# Patient Record
Sex: FEMALE | Race: WHITE | NOT HISPANIC OR LATINO | Employment: OTHER | URBAN - METROPOLITAN AREA
[De-identification: names, ages, dates, MRNs, and addresses within clinical notes are randomized per-mention and may not be internally consistent; named-entity substitution may affect disease eponyms.]

---

## 2023-09-12 ENCOUNTER — OFFICE VISIT (OUTPATIENT)
Dept: OBGYN CLINIC | Facility: MEDICAL CENTER | Age: 68
End: 2023-09-12
Payer: MEDICARE

## 2023-09-12 VITALS
HEIGHT: 63 IN | WEIGHT: 117 LBS | BODY MASS INDEX: 20.73 KG/M2 | SYSTOLIC BLOOD PRESSURE: 126 MMHG | DIASTOLIC BLOOD PRESSURE: 82 MMHG

## 2023-09-12 DIAGNOSIS — N63.11 BREAST LUMP ON RIGHT SIDE AT 11 O'CLOCK POSITION: Primary | ICD-10-CM

## 2023-09-12 PROCEDURE — 99203 OFFICE O/P NEW LOW 30 MIN: CPT | Performed by: OBSTETRICS & GYNECOLOGY

## 2023-09-12 RX ORDER — CETIRIZINE HYDROCHLORIDE 10 MG/1
CAPSULE, LIQUID FILLED ORAL
COMMUNITY

## 2023-09-12 RX ORDER — METHYLPREDNISOLONE 4 MG/1
TABLET ORAL
COMMUNITY
Start: 2011-02-11

## 2023-09-12 RX ORDER — AZITHROMYCIN 250 MG/1
TABLET, FILM COATED ORAL
COMMUNITY
Start: 2011-05-27

## 2023-09-12 RX ORDER — KRILL/OM-3/DHA/EPA/PHOSPHO/AST 500-110 MG
CAPSULE ORAL
COMMUNITY

## 2023-09-12 RX ORDER — LUTEIN 20 MG
CAPSULE ORAL
COMMUNITY

## 2023-09-12 RX ORDER — LEVOTHYROXINE SODIUM 25 MCG
TABLET ORAL
COMMUNITY
Start: 2023-08-29

## 2023-09-12 RX ORDER — MOMETASONE FUROATE 50 UG/1
SPRAY, METERED NASAL
COMMUNITY
Start: 2011-03-29

## 2023-09-12 RX ORDER — ACETAMINOPHEN 500 MG
TABLET ORAL
COMMUNITY

## 2023-09-12 RX ORDER — ESTRADIOL 0.1 MG/G
CREAM VAGINAL 2 TIMES DAILY
COMMUNITY
Start: 2012-12-27

## 2023-09-12 RX ORDER — GABAPENTIN 100 MG/1
CAPSULE ORAL
COMMUNITY

## 2023-09-12 NOTE — PROGRESS NOTES
[facility] [address] [place] [address]    Assessment/Plan:  No problem-specific Assessment & Plan notes found for this encounter. Diagnoses and all orders for this visit:    Breast lump on right side at 11 o'clock position  -     US breast right limited (diagnostic); Future  -     Mammo diagnostic right w 3d & cad; Future    Other orders  -     mometasone (Nasonex) 50 mcg/act nasal spray; into each nostril  -     methylPREDNISolone 4 MG tablet therapy pack; Take by mouth (Patient not taking: Reported on 9/12/2023)  -     Lutein-Zeaxanthin 20-0.8 MG CAPS;  (Patient not taking: Reported on 9/12/2023)  -     Synthroid 25 MCG tablet; TAKE 1 AND 1/2 TABLETS BY MOUTH EVERY DAY BUT ON SUNDAY AND FRIDAY TAKE 2.  -     Krill Oil (Omega-3) 500 MG CAPS;  (Patient not taking: Reported on 9/12/2023)  -     gabapentin (NEURONTIN) 100 mg capsule; Take by mouth (Patient not taking: Reported on 9/12/2023)  -     fluticasone (VERAMYST) 27.5 MCG/SPRAY nasal spray; 2 sprays into each nostril daily  -     estradiol (ESTRACE VAGINAL) 0.1 mg/g vaginal cream; Insert into the vagina Twice daily  -     esomeprazole (NexIUM) 20 mg capsule; Take 20 mg by mouth (Patient not taking: Reported on 9/12/2023)  -     Cholecalciferol 25 MCG (1000 UT) CHEW; Chew  -     Cetirizine HCl (ZyrTEC Allergy) 10 MG CAPS  -     azithromycin (ZITHROMAX) 250 mg tablet; Take by mouth  -     acetaminophen (TYLENOL) 500 mg tablet;  (Patient not taking: Reported on 9/12/2023)          Subjective:   Dat Antonio is a 70 y.o. No obstetric history on file. female. CC: right breast lump    HPI: HPI  Assessment:   Collin Hill was seen today for breast problem. Diagnoses and all orders for this visit:    Breast lump on right side at 11 o'clock position  -     US breast right limited (diagnostic); Future  -     Mammo diagnostic right w 3d & cad; Future        Plan:    Subjective:    Patient is a 70 y.o. y.o.  Female who presents for a problem visit. Pt is c/o right breast lump. She states she first noticed it about one month ago. Thought she had a bug bite but it did not resolve  With time. There is no problem list on file for this patient. No past medical history on file. No past surgical history on file. No family history on file.     Social History     Socioeconomic History   • Marital status: /Civil Union     Spouse name: Not on file   • Number of children: Not on file   • Years of education: Not on file   • Highest education level: Not on file   Occupational History   • Not on file   Tobacco Use   • Smoking status: Not on file   • Smokeless tobacco: Not on file   Substance and Sexual Activity   • Alcohol use: Not on file   • Drug use: Not on file   • Sexual activity: Not on file   Other Topics Concern   • Not on file   Social History Narrative   • Not on file     Social Determinants of Health     Financial Resource Strain: Not on file   Food Insecurity: Not on file   Transportation Needs: Not on file   Physical Activity: Not on file   Stress: Not on file   Social Connections: Not on file   Intimate Partner Violence: Not on file   Housing Stability: Not on file         Current Outpatient Medications:   •  azithromycin (ZITHROMAX) 250 mg tablet, Take by mouth, Disp: , Rfl:   •  Cetirizine HCl (ZyrTEC Allergy) 10 MG CAPS, , Disp: , Rfl:   •  Cholecalciferol 25 MCG (1000 UT) CHEW, Chew, Disp: , Rfl:   •  estradiol (ESTRACE VAGINAL) 0.1 mg/g vaginal cream, Insert into the vagina Twice daily, Disp: , Rfl:   •  fluticasone (VERAMYST) 27.5 MCG/SPRAY nasal spray, 2 sprays into each nostril daily, Disp: , Rfl:   •  mometasone (Nasonex) 50 mcg/act nasal spray, into each nostril, Disp: , Rfl:   •  Synthroid 25 MCG tablet, TAKE 1 AND 1/2 TABLETS BY MOUTH EVERY DAY BUT ON SUNDAY AND FRIDAY TAKE 2., Disp: , Rfl:   •  acetaminophen (TYLENOL) 500 mg tablet, , Disp: , Rfl:   •  esomeprazole (NexIUM) 20 mg capsule, Take 20 mg by mouth (Patient not taking: Reported on 9/12/2023), Disp: , Rfl:   •  gabapentin (NEURONTIN) 100 mg capsule, Take by mouth (Patient not taking: Reported on 9/12/2023), Disp: , Rfl:   •  Krill Oil (Omega-3) 500 MG CAPS, , Disp: , Rfl:   •  Lutein-Zeaxanthin 20-0.8 MG CAPS, , Disp: , Rfl:   •  methylPREDNISolone 4 MG tablet therapy pack, Take by mouth (Patient not taking: Reported on 9/12/2023), Disp: , Rfl:     Allergies   Allergen Reactions   • Oxycodone-Acetaminophen Other (See Comments)     tylox(rash)       ROS: Review of Systems   Constitutional: Negative. Respiratory: Negative. Cardiovascular: Negative. Gastrointestinal: Negative. Genitourinary: Negative. Musculoskeletal: Negative. All other systems reviewed and are negative. PFSH: The following portions of the patient's history were reviewed and updated as appropriate: allergies, current medications, past family history, past medical history, obstetric history, gynecologic history, past social history, past surgical history and problem list.       Objective:  /82   Ht 5' 3" (1.6 m)   Wt 53.1 kg (117 lb)   BMI 20.73 kg/m²    Physical Exam  Vitals reviewed. Constitutional:       Appearance: Normal appearance. Cardiovascular:      Rate and Rhythm: Normal rate. Pulmonary:      Effort: Pulmonary effort is normal. No respiratory distress. Chest:   Breasts:     Right: Mass (pea sized) and skin change present. No swelling, bleeding, inverted nipple, nipple discharge or tenderness. Left: Normal.   Neurological:      Mental Status: She is alert.    Psychiatric:         Mood and Affect: Mood normal.         Behavior: Behavior normal.

## 2023-09-18 ENCOUNTER — TELEPHONE (OUTPATIENT)
Dept: OBGYN CLINIC | Facility: MEDICAL CENTER | Age: 68
End: 2023-09-18

## 2023-09-18 NOTE — TELEPHONE ENCOUNTER
Patient called. She has to schedule a Mammogram and a US. Stated that in the place that she usually goes, they don't have availability until 10/09. Patient wants to know if is ok to wail until that date because she stated Minnie Londono let her know this was urgent. Patient was advice I will ask to the clinical team and in case it is recommended for her to schedule those appointments before she should try with another location. Please review when you have a chance, thank you!